# Patient Record
Sex: MALE | Race: WHITE | Employment: STUDENT | URBAN - METROPOLITAN AREA
[De-identification: names, ages, dates, MRNs, and addresses within clinical notes are randomized per-mention and may not be internally consistent; named-entity substitution may affect disease eponyms.]

---
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
## 2020-01-06 ENCOUNTER — TELEPHONE (OUTPATIENT)
Dept: FAMILY MEDICINE CLINIC | Facility: CLINIC | Age: 20
End: 2020-01-06

## 2020-01-06 NOTE — TELEPHONE ENCOUNTER
Pt transferred to me. He scheduled an appt tomorrow with Dr. Regina Oneill to discuss depression. Per pt, he has been feeling depressed for about 8mos now. Denies SI/HI. Will keep appt for tomorrow.

## 2020-01-06 NOTE — TELEPHONE ENCOUNTER
Called pts mother. Per mom, pt is extremely depressed. He goes to school at [employer]. He will be leaving on Friday to return to school in [address] [place] [address]. Mom does not think pt would hurt himself, but they are worried about the severity of his depression.   He has

## 2020-01-06 NOTE — TELEPHONE ENCOUNTER
Mom very concerned about her son in terms of hurting himself. Son has appt tomorrow and wanted to share some info prior. Pt does not have verbal release on file but she is listed as emergency contact.

## 2020-01-07 NOTE — PROGRESS NOTES
CC: Mood changes    HPI: Patient presents with mood changes. Is been going on for several months. Started after he had gone away to school in [place]. Reports feeling down somewhat depressed several days of each week for the last several months.   Moderate Consults:  None

## 2020-01-29 RX ORDER — ESCITALOPRAM OXALATE 10 MG/1
TABLET ORAL
Qty: 90 TABLET | Refills: 0 | Status: SHIPPED | OUTPATIENT
Start: 2020-01-29 | End: 2020-05-01

## 2020-01-29 NOTE — TELEPHONE ENCOUNTER
Last refilled on 1/7/20 for # 30 with 0 refills  Last OV 1/7/20 for adjustment disorder  No future appointments. Thank you.

## 2020-01-29 NOTE — TELEPHONE ENCOUNTER
Call patient, remind he is to find counseling at college and see me the next time he is on break and back home.

## 2020-04-30 NOTE — TELEPHONE ENCOUNTER
ESCITALOPRAM 10 MG Oral Tab      Last refilled: 1/29/20 - 90 tablets - 0 refills    Last OV: 1/7/20 - depressed mood -       No future appts. Please advise.

## 2020-05-01 NOTE — PROGRESS NOTES
Virtual Telephone Check-In    Tapanrosales Jacobo V verbally consents to a Virtual/Telephone Check-In visit on 05/01/20. Patient understands and accepts financial responsibility for any deductible, co-insurance and/or co-pays associated with this service.

## 2020-05-01 NOTE — TELEPHONE ENCOUNTER
Pt's mom returned call: on HIPPA. Pt's mom Ernestine  verbally consents to a Virtual/Telephone Check-In service on 5/1/20 for her son.    Patient understands and accepts financial responsibility for any deductible, co-insurance and/or co-pays associat

## 2020-07-29 DIAGNOSIS — F43.21 ADJUSTMENT DISORDER WITH DEPRESSED MOOD: ICD-10-CM

## 2020-07-29 NOTE — TELEPHONE ENCOUNTER
LOV 5/1/20 for adjustment disorder - televisit. ESCITALOPRAM 10 MG Oral Tab 90 tablet 0 5/1/2020    Sig:   TAKE 1 TABLET BY MOUTH EVERY DAY     Route:   (none)       No future appointments.

## 2020-07-31 RX ORDER — ESCITALOPRAM OXALATE 10 MG/1
TABLET ORAL
Qty: 90 TABLET | Refills: 0 | Status: SHIPPED | OUTPATIENT
Start: 2020-07-31 | End: 2020-10-27

## 2020-10-24 DIAGNOSIS — F43.21 ADJUSTMENT DISORDER WITH DEPRESSED MOOD: ICD-10-CM

## 2020-10-24 NOTE — TELEPHONE ENCOUNTER
LOV 1/7/20 for adjustment disorder. ESCITALOPRAM 10 MG Oral Tab 90 tablet 0 7/31/2020    Sig:   TAKE 1 TABLET BY MOUTH EVERY DAY     Route:   (none)       No future appointments. Please advise, needs OV?

## 2020-10-27 RX ORDER — ESCITALOPRAM OXALATE 10 MG/1
TABLET ORAL
Qty: 30 TABLET | Refills: 0 | Status: SHIPPED | OUTPATIENT
Start: 2020-10-27 | End: 2020-11-23

## 2020-10-27 NOTE — TELEPHONE ENCOUNTER
Spoke to mom of patient. Mom advised. Verbalized understanding. Will call back to schedule appointment.

## 2020-11-23 DIAGNOSIS — F43.21 ADJUSTMENT DISORDER WITH DEPRESSED MOOD: ICD-10-CM

## 2020-11-23 RX ORDER — ESCITALOPRAM OXALATE 10 MG/1
TABLET ORAL
Qty: 30 TABLET | Refills: 0 | Status: SHIPPED | OUTPATIENT
Start: 2020-11-23

## 2020-11-23 NOTE — TELEPHONE ENCOUNTER
LOV 1/7/20 for adjustment disorder. ESCITALOPRAM 10 MG Oral Tab 30 tablet 0 10/27/2020    Sig:   TAKE 1 TABLET BY MOUTH EVERY DAY     Route:   (none)       No future appointments. Please advise, needs f/u appt?

## 2021-04-24 ENCOUNTER — HOSPITAL ENCOUNTER (OUTPATIENT)
Age: 21
Discharge: HOME | End: 2021-04-24
Payer: COMMERCIAL

## 2021-04-24 VITALS
DIASTOLIC BLOOD PRESSURE: 66 MMHG | SYSTOLIC BLOOD PRESSURE: 115 MMHG | OXYGEN SATURATION: 97 % | RESPIRATION RATE: 16 BRPM | HEART RATE: 70 BPM | TEMPERATURE: 98 F

## 2021-04-24 DIAGNOSIS — R59.1 LYMPHADENOPATHY: ICD-10-CM

## 2021-04-24 DIAGNOSIS — T50.Z95A ADVERSE EFFECT OF VACCINE, INITIAL ENCOUNTER: Primary | ICD-10-CM

## 2021-04-24 PROCEDURE — 99202 OFFICE O/P NEW SF 15 MIN: CPT | Performed by: PHYSICIAN ASSISTANT

## 2021-04-24 RX ORDER — NAPROXEN 500 MG/1
500 TABLET ORAL 2 TIMES DAILY PRN
Qty: 20 TABLET | Refills: 0 | Status: SHIPPED | OUTPATIENT
Start: 2021-04-24 | End: 2021-05-01

## 2021-04-24 NOTE — ED INITIAL ASSESSMENT (HPI)
Pt received first dose of Moderna last Saturday. Pt states lymph nodes starting swelling to left arm after.   Pt states continues and painful

## 2021-04-24 NOTE — ED PROVIDER NOTES
Patient Seen in: [facility] [address] [place] [address]      History   Patient presents with:  Lymph Node    Stated Complaint: swollen lymph node under armpit- covid vaccine 04/17    HPI/Subjective:   HPI    Pleasant 62-year-old male.   Patient received his first Covid va lymphadenopathy. No erythema or warmth. This is same side he received his Covid vaccine. This is a known side effect. Cool compresses. NSAIDs. Will most likely resolve in the next week.                        Disposition and Plan     Clinical Impressi

## 2022-01-19 ENCOUNTER — HOSPITAL ENCOUNTER (OUTPATIENT)
Dept: ULTRASOUND IMAGING | Age: 22
Discharge: HOME | End: 2022-01-19
Attending: PHYSICIAN ASSISTANT
Payer: COMMERCIAL

## 2022-01-19 DIAGNOSIS — N50.812 LEFT TESTICULAR PAIN: ICD-10-CM

## 2022-01-19 DIAGNOSIS — N50.82 SCROTAL PAIN: ICD-10-CM

## 2022-01-19 DIAGNOSIS — R82.90 UNSPECIFIED ABNORMAL FINDINGS IN URINE: ICD-10-CM

## 2022-01-19 PROCEDURE — 76870 US EXAM SCROTUM: CPT | Performed by: PHYSICIAN ASSISTANT

## 2022-01-19 PROCEDURE — 93975 VASCULAR STUDY: CPT | Performed by: PHYSICIAN ASSISTANT

## 2022-01-20 ENCOUNTER — ORDER TRANSCRIPTION (OUTPATIENT)
Dept: ADMINISTRATIVE | Facility: HOSPITAL | Age: 22
End: 2022-01-20

## 2022-01-20 DIAGNOSIS — R10.32 LEFT GROIN PAIN: Primary | ICD-10-CM

## 2022-01-22 ENCOUNTER — HOSPITAL ENCOUNTER (OUTPATIENT)
Dept: ULTRASOUND IMAGING | Age: 22
Discharge: HOME | End: 2022-01-22
Attending: PHYSICIAN ASSISTANT
Payer: COMMERCIAL

## 2022-01-22 DIAGNOSIS — R82.90 UNSPECIFIED ABNORMAL FINDINGS IN URINE: ICD-10-CM

## 2022-01-22 DIAGNOSIS — N50.82 SCROTAL PAIN: ICD-10-CM

## 2022-01-22 DIAGNOSIS — N50.812 LEFT TESTICULAR PAIN: ICD-10-CM

## 2022-01-22 PROCEDURE — 76882 US LMTD JT/FCL EVL NVASC XTR: CPT | Performed by: FAMILY MEDICINE

## 2022-03-26 ENCOUNTER — OFFICE VISIT (OUTPATIENT)
Dept: FAMILY MEDICINE CLINIC | Facility: CLINIC | Age: 22
End: 2022-03-26
Payer: COMMERCIAL

## 2022-03-26 VITALS
DIASTOLIC BLOOD PRESSURE: 70 MMHG | BODY MASS INDEX: 16.22 KG/M2 | WEIGHT: 107 LBS | SYSTOLIC BLOOD PRESSURE: 118 MMHG | HEIGHT: 68.25 IN | RESPIRATION RATE: 16 BRPM | HEART RATE: 82 BPM | OXYGEN SATURATION: 97 % | TEMPERATURE: 98 F

## 2022-03-26 DIAGNOSIS — N43.3 HYDROCELE, UNSPECIFIED HYDROCELE TYPE: Primary | ICD-10-CM

## 2022-03-26 PROCEDURE — 90471 IMMUNIZATION ADMIN: CPT | Performed by: FAMILY MEDICINE

## 2022-03-26 PROCEDURE — 3078F DIAST BP <80 MM HG: CPT | Performed by: FAMILY MEDICINE

## 2022-03-26 PROCEDURE — 3008F BODY MASS INDEX DOCD: CPT | Performed by: FAMILY MEDICINE

## 2022-03-26 PROCEDURE — 3074F SYST BP LT 130 MM HG: CPT | Performed by: FAMILY MEDICINE

## 2022-03-26 PROCEDURE — 99213 OFFICE O/P EST LOW 20 MIN: CPT | Performed by: FAMILY MEDICINE

## 2022-03-26 PROCEDURE — 90715 TDAP VACCINE 7 YRS/> IM: CPT | Performed by: FAMILY MEDICINE

## 2024-09-17 ENCOUNTER — TELEPHONE (OUTPATIENT)
Dept: FAMILY MEDICINE CLINIC | Facility: CLINIC | Age: 24
End: 2024-09-17

## 2024-09-20 ENCOUNTER — HOSPITAL ENCOUNTER (OUTPATIENT)
Dept: GENERAL RADIOLOGY | Age: 24
Discharge: HOME | End: 2024-09-20
Attending: NURSE PRACTITIONER
Payer: COMMERCIAL

## 2024-09-20 ENCOUNTER — OFFICE VISIT (OUTPATIENT)
Dept: FAMILY MEDICINE CLINIC | Facility: CLINIC | Age: 24
End: 2024-09-20
Payer: COMMERCIAL

## 2024-09-20 VITALS
DIASTOLIC BLOOD PRESSURE: 79 MMHG | RESPIRATION RATE: 18 BRPM | SYSTOLIC BLOOD PRESSURE: 117 MMHG | HEIGHT: 68 IN | TEMPERATURE: 98 F | HEART RATE: 69 BPM | WEIGHT: 118 LBS | OXYGEN SATURATION: 99 % | BODY MASS INDEX: 17.88 KG/M2

## 2024-09-20 DIAGNOSIS — R05.1 ACUTE COUGH: Primary | ICD-10-CM

## 2024-09-20 DIAGNOSIS — S90.424A BLISTER OF TOE OF RIGHT FOOT WITHOUT INFECTION, INITIAL ENCOUNTER: ICD-10-CM

## 2024-09-20 DIAGNOSIS — J01.00 ACUTE NON-RECURRENT MAXILLARY SINUSITIS: ICD-10-CM

## 2024-09-20 DIAGNOSIS — R05.1 ACUTE COUGH: ICD-10-CM

## 2024-09-20 PROCEDURE — 71046 X-RAY EXAM CHEST 2 VIEWS: CPT | Performed by: NURSE PRACTITIONER

## 2024-09-20 RX ORDER — ALBUTEROL SULFATE 90 UG/1
1-2 INHALANT RESPIRATORY (INHALATION) EVERY 4 HOURS PRN
Qty: 1 EACH | Refills: 0 | Status: SHIPPED | OUTPATIENT
Start: 2024-09-20

## 2024-09-20 RX ORDER — BENZONATATE 200 MG/1
200 CAPSULE ORAL 3 TIMES DAILY PRN
Qty: 30 CAPSULE | Refills: 0 | Status: SHIPPED | OUTPATIENT
Start: 2024-09-20

## 2024-09-20 NOTE — PROGRESS NOTES
HPI:   Cough  This is a new problem. Episode onset: about 4 weeks ago. The problem has been waxing and waning (worsened last week, better slightly this week). The problem occurs every few minutes. Cough characteristics: mostly dry but occasionally productive. Thick, green sputum. Associated symptoms include a fever (most recent last week), nasal congestion, postnasal drip, rhinorrhea and a sore throat (from cough). Pertinent negatives include no chills, ear pain, shortness of breath or wheezing. Nothing aggravates the symptoms. He has tried OTC cough suppressant for the symptoms. The treatment provided no relief. There is no history of asthma.     Also has blister on bottom of right great toe. Noticed a couple of days ago. Would like to see podiatry     No current outpatient medications on file.      Past Medical History:    Delayed puberty      Past Surgical History:   Procedure Laterality Date    Appendectomy      Remove tonsils/adenoids,<12 y/o      Tonsillectomy        History reviewed. No pertinent family history.   Social History     Socioeconomic History    Marital status: Single   Tobacco Use    Smoking status: Never     Passive exposure: Never    Smokeless tobacco: Never    Tobacco comments:     NON-SMOKER   Vaping Use    Vaping status: Never Used   Substance and Sexual Activity    Alcohol use: Yes     Comment: socially    Drug use: No    Sexual activity: Never     Social Determinants of Health      Received from [facility]    Housing Stability         REVIEW OF SYSTEMS:   Review of Systems   Constitutional:  Positive for fatigue and fever (most recent last week). Negative for chills.   HENT:  Positive for congestion, postnasal drip, rhinorrhea, sinus pressure and sore throat (from cough). Negative for ear pain and trouble swallowing.    Respiratory:  Positive for cough. Negative for shortness of breath and wheezing.        EXAM:   /79 (BP Location: Left arm, Patient Position:  Sitting, Cuff Size: adult)   Pulse 69   Temp 97.6 °F (36.4 °C)   Resp 18   Ht 5' 8\" (1.727 m)   Wt 118 lb (53.5 kg)   SpO2 99%   BMI 17.94 kg/m²   Physical Exam  Constitutional:       General: He is not in acute distress.     Appearance: Normal appearance. He is ill-appearing.   HENT:      Right Ear: Tympanic membrane, ear canal and external ear normal.      Left Ear: Tympanic membrane, ear canal and external ear normal.      Nose: Mucosal edema and rhinorrhea present. Rhinorrhea is purulent.      Mouth/Throat:      Lips: Pink.      Mouth: Mucous membranes are moist.      Pharynx: Oropharynx is clear. Uvula midline.   Cardiovascular:      Rate and Rhythm: Normal rate and regular rhythm.      Heart sounds: Normal heart sounds.   Pulmonary:      Effort: Pulmonary effort is normal.      Breath sounds: Normal breath sounds. No wheezing or rhonchi.   Musculoskeletal:        Feet:    Neurological:      Mental Status: He is alert.         ASSESSMENT AND PLAN:   Diagnoses and all orders for this visit:    Acute cough  -     XR CHEST PA + LAT CHEST (CPT=71046); Future    Acute non-recurrent maxillary sinusitis    Blister of toe of right foot without infection, initial encounter  -     Podiatry Referral - In Network

## 2024-09-21 ENCOUNTER — TELEPHONE (OUTPATIENT)
Dept: FAMILY MEDICINE CLINIC | Facility: CLINIC | Age: 24
End: 2024-09-21

## 2024-09-21 NOTE — TELEPHONE ENCOUNTER
----- Message from Shanti Potter sent at 9/21/2024  8:34 AM CDT -----  Results reviewed.   No evidence of pneumonia  Antibiotic sent for sinus infection, as well as cough suppressant  If continuing to feel ill, please follow up

## 2024-09-23 NOTE — TELEPHONE ENCOUNTER
Written by LUI Ruiz on 9/21/2024  8:34 AM CDT  Seen by patient Nguyễn Moser V on 9/21/2024  8:45 AM

## 2025-01-19 NOTE — TELEPHONE ENCOUNTER
Patient had covid 2 wks ago, states he's still having sx, productive cough, and he can hear crackling in his chest when he breaths, no tightness, but states there are times when its difficult to breath.      Future Appointments   Date Time Provider Department Center   9/20/2024  1:00 PM Shanti Potter APRN [facility]       
Spoke with patient - has been sick for almost 4 weeks.  Was in [place] - had a cough.  Took 2 rapid Covid test - negative.  Feels like he is having \"crackling\" in his lungs when breathing in.  Had a fever up to 101 last Monday.    Patient had made appointment for Friday with the nurse practitioner.  Recommended that he goes to the [facility] now for [facility].  Patient states works later in the evening but will try to go.  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  Patient would like to keep appointment for Friday as of now.    Patient was able to speak in complete sentences without coughing.  Future Appointments   Date Time Provider Department Center   9/20/2024  1:00 PM Shanti Potter APRN [facility]         
no

## 2025-06-24 ENCOUNTER — OFFICE VISIT (OUTPATIENT)
Dept: FAMILY MEDICINE CLINIC | Facility: CLINIC | Age: 25
End: 2025-06-24
Payer: COMMERCIAL

## 2025-06-24 VITALS
RESPIRATION RATE: 18 BRPM | HEIGHT: 68 IN | WEIGHT: 118 LBS | BODY MASS INDEX: 17.88 KG/M2 | SYSTOLIC BLOOD PRESSURE: 98 MMHG | HEART RATE: 78 BPM | TEMPERATURE: 97 F | OXYGEN SATURATION: 99 % | DIASTOLIC BLOOD PRESSURE: 68 MMHG

## 2025-06-24 DIAGNOSIS — N52.9 ERECTILE DYSFUNCTION, UNSPECIFIED ERECTILE DYSFUNCTION TYPE: ICD-10-CM

## 2025-06-24 DIAGNOSIS — L30.9 DERMATITIS: Primary | ICD-10-CM

## 2025-06-24 PROCEDURE — 3078F DIAST BP <80 MM HG: CPT | Performed by: FAMILY MEDICINE

## 2025-06-24 PROCEDURE — 99214 OFFICE O/P EST MOD 30 MIN: CPT | Performed by: FAMILY MEDICINE

## 2025-06-24 PROCEDURE — 3008F BODY MASS INDEX DOCD: CPT | Performed by: FAMILY MEDICINE

## 2025-06-24 PROCEDURE — 3074F SYST BP LT 130 MM HG: CPT | Performed by: FAMILY MEDICINE

## 2025-06-24 RX ORDER — SILDENAFIL 50 MG/1
50 TABLET, FILM COATED ORAL
Qty: 10 TABLET | Refills: 0 | Status: SHIPPED | OUTPATIENT
Start: 2025-06-24

## 2025-06-24 NOTE — PROGRESS NOTES
Subjective:   Nguyễn Moser V is a 24 year old male who presents for Sexual Problem (Sexual health. Consents to abridge. )        The following individual(s) verbally consented to be recorded using ambient AI listening technology and understand that they can each withdraw their consent to this listening technology at any point by asking the clinician to turn off or pause the recording:    Patient name: Nguyễn Moser V    History/Other:   History of Present Illness  Nguyễn Moser V is a 24 year old male who presents with erectile dysfunction and stress-related skin issues.    He experiences difficulty maintaining an erection during intercourse, attributing this to recent stress. This issue is new, and he is concerned about potential underlying causes such as blood flow or pressure issues. He is not currently taking any medications. No pain, discharge, or lesions in the genital area. The problem is described as not being fully erect at times, rather than a complete inability to sustain an erection.    He is undergoing significant stress due to recent life changes, including his parents' divorce and his father's job loss. His father is moving to [place], and he is responsible for finding a new place to live for himself and his sister, who is attending school in [place]. He is currently working and has recently been promoted, which has added to his stress levels.    He has noticed that his skin has started to break out, particularly on his face, which he associates with stress. His father has psoriasis, but he is unsure if his skin issues are related. The skin flare-ups occur primarily during stressful periods.   Chief Complaint Reviewed and Verified  Nursing Notes Reviewed and   Verified  Tobacco Reviewed  Allergies Reviewed  Medications Reviewed         Tobacco:  He has never smoked tobacco.    Current Medications[1]    PHQ-2 SCORE: 0  , done 6/24/2025          Review of Systems:  As above  neg fever    Objective:   BP 98/68 (BP Location: Left arm, Patient Position: Sitting, Cuff Size: adult)   Pulse 78   Temp 97.4 °F (36.3 °C) (Temporal)   Resp 18   Ht 5' 8\" (1.727 m)   Wt 118 lb (53.5 kg)   SpO2 99%   BMI 17.94 kg/m²  Estimated body mass index is 17.94 kg/m² as calculated from the following:    Height as of this encounter: 5' 8\" (1.727 m).    Weight as of this encounter: 118 lb (53.5 kg).  Results         Physical Exam      GENERAL: well developed, well nourished,in no apparent distress  SKIN: warm and dry with normal turgor  LUNGS: clear to auscultation  CARDIO: RRR without murmur  Skin rash noted over forehead erythematous elevated mulitple papules      Assessment & Plan:   1. Dermatitis (Primary)  -     Derm Referral - In Network  -     Testosterone, Total And Free; Future; Expected date: 06/24/2025  -     TSH W Reflex To Free T4; Future; Expected date: 06/24/2025  -     CBC, Platelet; No Differential; Future; Expected date: 06/24/2025  -     Comp Metabolic Panel (14); Future; Expected date: 06/24/2025  2. Erectile dysfunction, unspecified erectile dysfunction type  -     Sildenafil Citrate; Take 1 tablet (50 mg total) by mouth daily as needed for Erectile Dysfunction (1 hr before sexually active).  Dispense: 10 tablet; Refill: 0  -     Testosterone, Total And Free; Future; Expected date: 06/24/2025  -     TSH W Reflex To Free T4; Future; Expected date: 06/24/2025  -     Comp Metabolic Panel (14); Future; Expected date: 06/24/2025    Assessment & Plan  Erectile Dysfunction  Intermittent erectile dysfunction likely related to stress from recent life changes, including family issues and job stress. No prior erectile issues, pain, discharge, or lesions. Differential includes possible low testosterone or thyroid dysfunction. Sildenafil discussed as a temporary measure, with potential normalization of erectile function upon stress reduction.   - Prescribe sildenafil 50 mg, to be taken one hour  before sexual activity. May take half a tablet if sufficient.  - Order fasting blood work before 10 AM to assess testosterone levels, thyroid function, and complete blood count.  - Discuss potential testosterone replacement therapy if low testosterone is confirmed. Consider urology referral if needed.    Skin Breakout  Facial skin breakout possibly stress-related. Family history of psoriasis, but no definitive diagnosis. Symptoms include a pinkish hue to the face, exacerbated by stress. Dermatology evaluation advised.  - Refer to dermatologist for further evaluation.    Stress Management  Discussed the impact of stress on health, including sexual health and skin condition. Provided guidance on stress management techniques.  - Provide anticipatory guidance on stress management techniques.    Recording duration: 8 minutes        No follow-ups on file.        Saloni Moore MD, 6/24/2025, 6:11 PM            [1]   Current Outpatient Medications   Medication Sig Dispense Refill    Sildenafil Citrate 50 MG Oral Tab Take 1 tablet (50 mg total) by mouth daily as needed for Erectile Dysfunction (1 hr before sexually active). 10 tablet 0

## 2025-06-27 ENCOUNTER — LAB ENCOUNTER (OUTPATIENT)
Dept: LAB | Age: 25
End: 2025-06-27
Attending: FAMILY MEDICINE
Payer: COMMERCIAL

## 2025-06-27 DIAGNOSIS — L30.9 DERMATITIS: ICD-10-CM

## 2025-06-27 DIAGNOSIS — N52.9 ERECTILE DYSFUNCTION, UNSPECIFIED ERECTILE DYSFUNCTION TYPE: ICD-10-CM

## 2025-06-27 LAB
ALBUMIN SERPL-MCNC: 5.2 G/DL (ref 3.2–4.8)
ALBUMIN/GLOB SERPL: 1.9 {RATIO} (ref 1–2)
ALP LIVER SERPL-CCNC: 55 U/L (ref 45–117)
ALT SERPL-CCNC: 9 U/L (ref 10–49)
ANION GAP SERPL CALC-SCNC: 6 MMOL/L (ref 0–18)
AST SERPL-CCNC: 14 U/L (ref ?–34)
BILIRUB SERPL-MCNC: 0.6 MG/DL (ref 0.3–1.2)
BUN BLD-MCNC: 12 MG/DL (ref 9–23)
CALCIUM BLD-MCNC: 10.2 MG/DL (ref 8.7–10.6)
CHLORIDE SERPL-SCNC: 106 MMOL/L (ref 98–112)
CO2 SERPL-SCNC: 30 MMOL/L (ref 21–32)
CREAT BLD-MCNC: 1.14 MG/DL (ref 0.7–1.3)
EGFRCR SERPLBLD CKD-EPI 2021: 92 ML/MIN/1.73M2 (ref 60–?)
ERYTHROCYTE [DISTWIDTH] IN BLOOD BY AUTOMATED COUNT: 11.9 %
FASTING STATUS PATIENT QL REPORTED: YES
GLOBULIN PLAS-MCNC: 2.7 G/DL (ref 2–3.5)
GLUCOSE BLD-MCNC: 95 MG/DL (ref 70–99)
HCT VFR BLD AUTO: 44.3 % (ref 39–53)
HGB BLD-MCNC: 15.5 G/DL (ref 13–17.5)
MCH RBC QN AUTO: 29 PG (ref 26–34)
MCHC RBC AUTO-ENTMCNC: 35 G/DL (ref 31–37)
MCV RBC AUTO: 83 FL (ref 80–100)
OSMOLALITY SERPL CALC.SUM OF ELEC: 294 MOSM/KG (ref 275–295)
PLATELET # BLD AUTO: 293 10(3)UL (ref 150–450)
POTASSIUM SERPL-SCNC: 4.1 MMOL/L (ref 3.5–5.1)
PROT SERPL-MCNC: 7.9 G/DL (ref 5.7–8.2)
RBC # BLD AUTO: 5.34 X10(6)UL (ref 4.3–5.7)
SODIUM SERPL-SCNC: 142 MMOL/L (ref 136–145)
TSI SER-ACNC: 1.41 UIU/ML (ref 0.55–4.78)
WBC # BLD AUTO: 5.3 X10(3) UL (ref 4–11)

## 2025-06-27 PROCEDURE — 84402 ASSAY OF FREE TESTOSTERONE: CPT

## 2025-06-27 PROCEDURE — 36415 COLL VENOUS BLD VENIPUNCTURE: CPT

## 2025-06-27 PROCEDURE — 85027 COMPLETE CBC AUTOMATED: CPT

## 2025-06-27 PROCEDURE — 84403 ASSAY OF TOTAL TESTOSTERONE: CPT

## 2025-06-27 PROCEDURE — 80053 COMPREHEN METABOLIC PANEL: CPT

## 2025-06-27 PROCEDURE — 84443 ASSAY THYROID STIM HORMONE: CPT

## 2025-06-30 LAB
FREE TESTOST DIRECT: 16.4 PG/ML
TESTOSTERONE: 815 NG/DL

## 2025-07-23 DIAGNOSIS — N52.9 ERECTILE DYSFUNCTION, UNSPECIFIED ERECTILE DYSFUNCTION TYPE: ICD-10-CM

## 2025-07-23 RX ORDER — SILDENAFIL 50 MG/1
TABLET, FILM COATED ORAL
Qty: 6 TABLET | Refills: 0 | Status: SHIPPED | OUTPATIENT
Start: 2025-07-23

## 2025-07-23 NOTE — TELEPHONE ENCOUNTER
Last refill: 06/24/25  Qty 10 tabs  W/ 0 refills  Last ov:  06/24/25    Requested Prescriptions     Pending Prescriptions Disp Refills    SILDENAFIL CITRATE 50 MG Oral Tab [Pharmacy Med Name: SILDENAFIL 50 MG TABLET] 6 tablet 1     Sig: TAKE 1 TABLET BY MOUTH DAILY AS NEEDED FOR ERECTILE DYSFUNCTION (1 HR BEFORE SEXUALLY ACTIVE).     No future appointments.

## 2025-07-23 NOTE — TELEPHONE ENCOUNTER
Spoke to patient and scheduled physical.   Future Appointments   Date Time Provider Department Center   8/26/2025  3:20 PM Saloni Moore MD [facility]

## 2025-08-26 ENCOUNTER — OFFICE VISIT (OUTPATIENT)
Dept: FAMILY MEDICINE CLINIC | Facility: CLINIC | Age: 25
End: 2025-08-26

## 2025-08-26 ENCOUNTER — LAB ENCOUNTER (OUTPATIENT)
Dept: LAB | Age: 25
End: 2025-08-26
Attending: FAMILY MEDICINE

## 2025-08-26 VITALS
BODY MASS INDEX: 17.58 KG/M2 | RESPIRATION RATE: 18 BRPM | SYSTOLIC BLOOD PRESSURE: 100 MMHG | HEART RATE: 79 BPM | DIASTOLIC BLOOD PRESSURE: 78 MMHG | TEMPERATURE: 97 F | WEIGHT: 112 LBS | HEIGHT: 67 IN | OXYGEN SATURATION: 99 %

## 2025-08-26 DIAGNOSIS — Z00.00 ANNUAL PHYSICAL EXAM: Primary | ICD-10-CM

## 2025-08-26 DIAGNOSIS — Z00.00 ANNUAL PHYSICAL EXAM: ICD-10-CM

## 2025-08-26 DIAGNOSIS — Z23 NEED FOR VACCINATION: ICD-10-CM

## 2025-08-26 LAB
CHOLEST SERPL-MCNC: 178 MG/DL (ref ?–200)
FASTING PATIENT LIPID ANSWER: NO
HDLC SERPL-MCNC: 57 MG/DL (ref 40–59)
LDLC SERPL CALC-MCNC: 110 MG/DL (ref ?–100)
NONHDLC SERPL-MCNC: 121 MG/DL (ref ?–130)
TRIGL SERPL-MCNC: 54 MG/DL (ref 30–149)
VLDLC SERPL CALC-MCNC: 9 MG/DL (ref 0–30)

## 2025-08-26 PROCEDURE — 80061 LIPID PANEL: CPT | Performed by: FAMILY MEDICINE

## (undated) NOTE — MR AVS SNAPSHOT
[address] [facility] [address] [phone]               Thank you for choosing us for your health care visit with Mine Willard DO.   We are glad to serve you and happy to provide you with this summary of your vi *Growth percentiles are based on CDC 2-20 Years data     BP percentiles are based on 2000 NHANES data         Current Medications      Notice  As of 5/30/2017 10:43 AM    You have not been prescribed any medications.             John     Sign up for [facility]

## (undated) NOTE — LETTER
Date: 11/16/2017    Patient Name: Velia Magallanes          To Whom it may concern: This letter has been written at the patient's request. The above patient was seen at the [facility] for treatment of a medical condition.     This patient